# Patient Record
Sex: MALE | Race: WHITE | NOT HISPANIC OR LATINO | Employment: UNEMPLOYED | URBAN - METROPOLITAN AREA
[De-identification: names, ages, dates, MRNs, and addresses within clinical notes are randomized per-mention and may not be internally consistent; named-entity substitution may affect disease eponyms.]

---

## 2021-07-12 ENCOUNTER — OFFICE VISIT (OUTPATIENT)
Dept: FAMILY MEDICINE CLINIC | Facility: CLINIC | Age: 26
End: 2021-07-12

## 2021-07-12 VITALS
OXYGEN SATURATION: 98 % | HEART RATE: 66 BPM | WEIGHT: 167 LBS | HEIGHT: 73 IN | BODY MASS INDEX: 22.13 KG/M2 | SYSTOLIC BLOOD PRESSURE: 122 MMHG | DIASTOLIC BLOOD PRESSURE: 80 MMHG

## 2021-07-12 DIAGNOSIS — Z00.00 PREVENTATIVE HEALTH CARE: ICD-10-CM

## 2021-07-12 DIAGNOSIS — J40 BRONCHITIS: ICD-10-CM

## 2021-07-12 DIAGNOSIS — F39 MOOD DISORDER (HCC): Primary | ICD-10-CM

## 2021-07-12 PROCEDURE — 80048 BASIC METABOLIC PNL TOTAL CA: CPT | Performed by: NURSE PRACTITIONER

## 2021-07-12 PROCEDURE — 86803 HEPATITIS C AB TEST: CPT | Performed by: NURSE PRACTITIONER

## 2021-07-12 PROCEDURE — 85027 COMPLETE CBC AUTOMATED: CPT | Performed by: NURSE PRACTITIONER

## 2021-07-12 PROCEDURE — 99203 OFFICE O/P NEW LOW 30 MIN: CPT | Performed by: NURSE PRACTITIONER

## 2021-07-12 RX ORDER — METHYLPREDNISOLONE 4 MG/1
TABLET ORAL
Qty: 21 TABLET | Refills: 0 | Status: SHIPPED | OUTPATIENT
Start: 2021-07-12 | End: 2021-07-19

## 2021-07-12 RX ORDER — AZITHROMYCIN 250 MG/1
TABLET, FILM COATED ORAL
Qty: 6 TABLET | Refills: 0 | Status: SHIPPED | OUTPATIENT
Start: 2021-07-12 | End: 2021-07-19

## 2021-07-12 RX ORDER — HYDROXYZINE PAMOATE 25 MG/1
25 CAPSULE ORAL 3 TIMES DAILY PRN
Qty: 30 CAPSULE | Refills: 1 | Status: SHIPPED | OUTPATIENT
Start: 2021-07-12

## 2021-07-12 RX ORDER — ARIPIPRAZOLE 2 MG/1
2 TABLET ORAL DAILY
Qty: 30 TABLET | Refills: 1 | Status: SHIPPED | OUTPATIENT
Start: 2021-07-12 | End: 2021-09-30 | Stop reason: SDUPTHER

## 2021-07-12 RX ORDER — ALBUTEROL SULFATE 90 UG/1
2 AEROSOL, METERED RESPIRATORY (INHALATION) EVERY 4 HOURS PRN
Qty: 1 G | Refills: 0 | Status: SHIPPED | OUTPATIENT
Start: 2021-07-12 | End: 2021-07-19 | Stop reason: SDUPTHER

## 2021-07-12 NOTE — ASSESSMENT & PLAN NOTE
1.  Start Z-Vignesh  2.  Start Medrol Dosepak  3.  ProAir 2 puffs every 4 hours as needed for dyspnea or wheezing  4.  If no improvement within 3 to 5 days

## 2021-07-12 NOTE — ASSESSMENT & PLAN NOTE
1.  Symptom presentation consistent with bipolar disorder  2.  Start Abilify 2 mg daily  3.  Refer to psychiatry  4.  Patient to contact [facility] senior care to determine if internal medical team will provide Abilify while incarcerated

## 2021-07-12 NOTE — PROGRESS NOTES
"Chief Complaint  Establish Care (dealing with a lot of anger issues/depression and anxiety)    Subjective          Ethan Nicole presents to [facility]  History of Present Illness    Patient here to establish care.     Patient presents with worsening anxiety, depression and mood instability.  Patient reports he struggles with anger and inability to control his anger.  He has a history of illicit drug use.  Patient reports he previously took Abilify which seemed to work well for him.  He is anxious about starting new medication.  Patient reports that he has a court date in 2 weeks which may result in incarceration.  He is concerned about inconsistency in receiving medication.    Patient also c/o productive cough, wheezing, low grade fever, and dyspnea. Symptoms started approximately four days ago.  Patient's girlfriend reports that her children were recently ill with a respiratory virus.      Objective   Vital Signs:   /80 (BP Location: Left arm, Patient Position: Sitting, Cuff Size: Adult)   Pulse 66   Ht 185.4 cm (73\")   Wt 75.8 kg (167 lb)   SpO2 98%   BMI 22.03 kg/m²       Physical Exam  Constitutional:       Appearance: Normal appearance.   HENT:      Head: Normocephalic.   Cardiovascular:      Rate and Rhythm: Normal rate and regular rhythm.   Pulmonary:      Effort: Pulmonary effort is normal.      Breath sounds: Examination of the right-lower field reveals decreased breath sounds and wheezing. Examination of the left-lower field reveals decreased breath sounds and wheezing. Decreased breath sounds and wheezing present.   Abdominal:      General: Abdomen is flat. Bowel sounds are normal.      Palpations: Abdomen is soft.   Musculoskeletal:         General: Normal range of motion.      Cervical back: Neck supple.      Right lower leg: No edema.      Left lower leg: No edema.   Skin:     General: Skin is warm and dry.   Neurological:      Mental Status: He is alert and " oriented to person, place, and time.      Gait: Gait is intact.   Psychiatric:         Attention and Perception: Attention normal.         Mood and Affect: Mood is anxious.         Speech: Speech normal.        Result Review :                 Assessment and Plan    Diagnoses and all orders for this visit:    1. Mood disorder (CMS/HCC) (Primary)  Assessment & Plan:  1.  Symptom presentation consistent with bipolar disorder  2.  Start Abilify 2 mg daily  3.  Refer to psychiatry  4.  Patient to contact [facility] snf to determine if internal medical team will provide Abilify while incarcerated    Orders:  -     Ambulatory Referral to Psychiatry    2. Preventative health care  Assessment & Plan:  1.  Check labs    Orders:  -     CBC (No Diff)  -     Basic Metabolic Panel  -     Hepatitis C Antibody    3. Bronchitis  Assessment & Plan:  1.  Start Z-Vignesh  2.  Start Medrol Dosepak  3.  ProAir 2 puffs every 4 hours as needed for dyspnea or wheezing  4.  If no improvement within 3 to 5 days      Other orders  -     ARIPiprazole (Abilify) 2 MG tablet; Take 1 tablet by mouth Daily.  Dispense: 30 tablet; Refill: 1  -     hydrOXYzine pamoate (Vistaril) 25 MG capsule; Take 1 capsule by mouth 3 (Three) Times a Day As Needed for Itching.  Dispense: 30 capsule; Refill: 1  -     albuterol sulfate  (90 Base) MCG/ACT inhaler; Inhale 2 puffs Every 4 (Four) Hours As Needed for Wheezing.  Dispense: 1 g; Refill: 0  -     methylPREDNISolone (MEDROL) 4 MG dose pack; Take as directed on package instructions.  Dispense: 21 tablet; Refill: 0  -     azithromycin (Zithromax) 250 MG tablet; Take 2 tablets the first day, then 1 tablet daily for 4 days.  Dispense: 6 tablet; Refill: 0    I spent 30 minutes caring for Ethan on this date of service. This time includes time spent by me in the following activities:preparing for the visit, obtaining and/or reviewing a separately obtained history, performing a medically appropriate examination and/or  evaluation , counseling and educating the patient/family/caregiver, ordering medications, tests, or procedures, referring and communicating with other health care professionals , documenting information in the medical record and care coordination  Follow Up   Return in about 4 weeks (around 8/9/2021).  Patient was given instructions and counseling regarding his condition or for health maintenance advice. Please see specific information pulled into the AVS if appropriate.

## 2021-07-13 LAB
ANION GAP SERPL CALCULATED.3IONS-SCNC: 17.7 MMOL/L (ref 5–15)
BUN SERPL-MCNC: 10 MG/DL (ref 6–20)
BUN/CREAT SERPL: 10.4 (ref 7–25)
CALCIUM SPEC-SCNC: 9.5 MG/DL (ref 8.6–10.5)
CHLORIDE SERPL-SCNC: 101 MMOL/L (ref 98–107)
CO2 SERPL-SCNC: 25.3 MMOL/L (ref 22–29)
CREAT SERPL-MCNC: 0.96 MG/DL (ref 0.76–1.27)
DEPRECATED RDW RBC AUTO: 44.3 FL (ref 37–54)
ERYTHROCYTE [DISTWIDTH] IN BLOOD BY AUTOMATED COUNT: 13.2 % (ref 12.3–15.4)
GFR SERPL CREATININE-BSD FRML MDRD: 97 ML/MIN/1.73
GLUCOSE SERPL-MCNC: 84 MG/DL (ref 65–99)
HCT VFR BLD AUTO: 46.5 % (ref 37.5–51)
HCV AB SER DONR QL: NORMAL
HGB BLD-MCNC: 15.5 G/DL (ref 13–17.7)
MCH RBC QN AUTO: 30.1 PG (ref 26.6–33)
MCHC RBC AUTO-ENTMCNC: 33.3 G/DL (ref 31.5–35.7)
MCV RBC AUTO: 90.3 FL (ref 79–97)
PLATELET # BLD AUTO: 217 10*3/MM3 (ref 140–450)
PMV BLD AUTO: 11.8 FL (ref 6–12)
POTASSIUM SERPL-SCNC: 4.7 MMOL/L (ref 3.5–5.2)
RBC # BLD AUTO: 5.15 10*6/MM3 (ref 4.14–5.8)
SODIUM SERPL-SCNC: 144 MMOL/L (ref 136–145)
WBC # BLD AUTO: 4.57 10*3/MM3 (ref 3.4–10.8)

## 2021-07-13 NOTE — TELEPHONE ENCOUNTER
Caller: BRODY DE SOUZA    Relationship: Emergency Contact    Best call back number: [phone]    Medication needed:   Requested Prescriptions     Pending Prescriptions Disp Refills   • ARIPiprazole (Abilify) 2 MG tablet 30 tablet 1     Sig: Take 1 tablet by mouth Daily.       When do you need the refill by: ASAP    What additional details did the patient provide when requesting the medication: PATIENT HAS MISPLACED BOTTLE AND NEEDS NEW     Does the patient have less than a 3 day supply:  [x] Yes  [] No    What is the patient's preferred pharmacy: [facility]/PHARMACY #[id number] [place], [place] - [address] [place] [address] [phone] [facility] [phone]

## 2021-07-14 ENCOUNTER — TELEPHONE (OUTPATIENT)
Dept: FAMILY MEDICINE CLINIC | Facility: CLINIC | Age: 26
End: 2021-07-14

## 2021-07-14 RX ORDER — ARIPIPRAZOLE 2 MG/1
2 TABLET ORAL DAILY
Qty: 30 TABLET | Refills: 1 | OUTPATIENT
Start: 2021-07-14

## 2021-07-14 NOTE — TELEPHONE ENCOUNTER
"Hub ok to share:    \"There is a refill on his bottle of abilify. He just needs to contact pharmacy and tell them to fill. Advise him that insurance will not likely cover since it is too soon. \"  "

## 2021-07-19 ENCOUNTER — OFFICE VISIT (OUTPATIENT)
Dept: FAMILY MEDICINE CLINIC | Facility: CLINIC | Age: 26
End: 2021-07-19

## 2021-07-19 VITALS
HEART RATE: 69 BPM | BODY MASS INDEX: 21.74 KG/M2 | OXYGEN SATURATION: 99 % | WEIGHT: 164 LBS | SYSTOLIC BLOOD PRESSURE: 138 MMHG | HEIGHT: 73 IN | DIASTOLIC BLOOD PRESSURE: 64 MMHG

## 2021-07-19 DIAGNOSIS — M25.562 CHRONIC PAIN OF LEFT KNEE: Primary | ICD-10-CM

## 2021-07-19 DIAGNOSIS — G89.29 CHRONIC PAIN OF LEFT KNEE: Primary | ICD-10-CM

## 2021-07-19 DIAGNOSIS — J40 BRONCHITIS: ICD-10-CM

## 2021-07-19 PROCEDURE — 99213 OFFICE O/P EST LOW 20 MIN: CPT | Performed by: NURSE PRACTITIONER

## 2021-07-19 PROCEDURE — 96372 THER/PROPH/DIAG INJ SC/IM: CPT | Performed by: NURSE PRACTITIONER

## 2021-07-19 RX ORDER — METHYLPREDNISOLONE ACETATE 40 MG/ML
40 INJECTION, SUSPENSION INTRA-ARTICULAR; INTRALESIONAL; INTRAMUSCULAR; SOFT TISSUE ONCE
Status: COMPLETED | OUTPATIENT
Start: 2021-07-19 | End: 2021-07-19

## 2021-07-19 RX ORDER — LEVOFLOXACIN 500 MG/1
500 TABLET, FILM COATED ORAL DAILY
Qty: 7 TABLET | Refills: 0 | Status: SHIPPED | OUTPATIENT
Start: 2021-07-19

## 2021-07-19 RX ORDER — ALBUTEROL SULFATE 90 UG/1
2 AEROSOL, METERED RESPIRATORY (INHALATION) EVERY 4 HOURS PRN
Qty: 1 G | Refills: 0 | Status: SHIPPED | OUTPATIENT
Start: 2021-07-19

## 2021-07-19 RX ADMIN — METHYLPREDNISOLONE ACETATE 40 MG: 40 INJECTION, SUSPENSION INTRA-ARTICULAR; INTRALESIONAL; INTRAMUSCULAR; SOFT TISSUE at 14:38

## 2021-07-19 NOTE — PROGRESS NOTES
"Chief Complaint  Cough (bronchitis not getting any better. He finished abx and steroid) and Knee Pain (left knee pain/buckling all the time. Feels like something is torn)    Subjective          Ethan Nicole presents to [facility]  History of Present Illness    Patient continues to have productive cough, wheezing and dyspnea.  He completed azithromycin and Medrol Dosepak with some relief but reports symptoms persist.  Patient denies fever.    Patient also c/o chronic left knee pain, reports his knee neva when he bears full weight in certain positions.  Patient reports it has been injured for approximately 3 years but cannot recall specifics of injury.  He believes he had an x-ray 3 years ago when incarcerated, unsure of results.  Patient reports that also has intermittent swelling.  Pain is moderate to severe.    Objective   Vital Signs:   /64 (BP Location: Left arm, Patient Position: Sitting, Cuff Size: Adult)   Pulse 69   Ht 185.4 cm (73\")   Wt 74.4 kg (164 lb)   SpO2 99%   BMI 21.64 kg/m²       Physical Exam  Constitutional:       Appearance: Normal appearance.   HENT:      Head: Normocephalic.   Cardiovascular:      Rate and Rhythm: Normal rate and regular rhythm.   Pulmonary:      Effort: Pulmonary effort is normal.      Breath sounds: Examination of the right-middle field reveals wheezing. Examination of the left-middle field reveals wheezing. Wheezing present.   Abdominal:      General: Abdomen is flat. Bowel sounds are normal.      Palpations: Abdomen is soft.   Musculoskeletal:         General: Normal range of motion.      Cervical back: Neck supple.      Left knee: Swelling present. Tenderness present.      Right lower leg: No edema.      Left lower leg: No edema.   Skin:     General: Skin is warm and dry.   Neurological:      Mental Status: He is alert and oriented to person, place, and time.      Gait: Gait is intact.   Psychiatric:         Attention and " Perception: Attention normal.         Mood and Affect: Mood normal.         Speech: Speech normal.        Result Review :                 Assessment and Plan    Diagnoses and all orders for this visit:    1. Chronic pain of left knee (Primary)  Assessment & Plan:  1.  X-ray left knee  2.  MRI likely indicated, will await x-ray results    Orders:  -     XR Knee 3 View Left; Future    2. Bronchitis  Assessment & Plan:  1.  Lung sounds are abnormal but improved  2.  Depo-Medrol 40 mg IM x1  3.  Levaquin 500 milligrams daily x7 days  4.  Patient to use ProAir, 2 puffs as needed for dyspnea or wheezing  5.  Call if symptoms persist    Orders:  -     methylPREDNISolone acetate (DEPO-medrol) injection 40 mg    Other orders  -     albuterol sulfate  (90 Base) MCG/ACT inhaler; Inhale 2 puffs Every 4 (Four) Hours As Needed for Wheezing.  Dispense: 1 g; Refill: 0  -     levoFLOXacin (Levaquin) 500 MG tablet; Take 1 tablet by mouth Daily.  Dispense: 7 tablet; Refill: 0    I spent 20 minutes caring for Ethan on this date of service. This time includes time spent by me in the following activities:preparing for the visit, obtaining and/or reviewing a separately obtained history, performing a medically appropriate examination and/or evaluation , counseling and educating the patient/family/caregiver, ordering medications, tests, or procedures and documenting information in the medical record  Follow Up   Return if symptoms worsen or fail to improve.  Patient was given instructions and counseling regarding his condition or for health maintenance advice. Please see specific information pulled into the AVS if appropriate.

## 2021-07-19 NOTE — ASSESSMENT & PLAN NOTE
1.  Lung sounds are abnormal but improved  2.  Depo-Medrol 40 mg IM x1  3.  Levaquin 500 milligrams daily x7 days  4.  Patient to use ProAir, 2 puffs as needed for dyspnea or wheezing  5.  Call if symptoms persist

## 2021-09-30 RX ORDER — ARIPIPRAZOLE 2 MG/1
2 TABLET ORAL DAILY
Qty: 30 TABLET | Refills: 1 | Status: SHIPPED | OUTPATIENT
Start: 2021-09-30

## 2021-09-30 NOTE — TELEPHONE ENCOUNTER
Caller: BRODY DE SOUZA    Relationship: Emergency Contact      Medication requested (name and dosage): ARIPiprazole (Abilify) 2 MG tablet    Pharmacy where request should be sent: [facility]/pharmacy #[id number] - [place], [place] - [address] - [phone]  - [phone]   [phone]    Additional details provided by patient: PATIENT HAS BEEN INCARCERATED FOR THE PAST MONTH AND A HALF AND WAS BEING GIVEN HIS MEDICATION WHILE IN skilled nursing BY THE PHYSICIAN AT THE skilled nursing, PATIENT HAS BEEN RELEASED AND HAS NO MEDICATION.    Best call back number: [phone]*    Does the patient have less than a 3 day supply:  [x] Yes  [] No    Aubree Hallman   09/30/21 12:16 EDT

## 2021-10-13 ENCOUNTER — TELEPHONE (OUTPATIENT)
Dept: FAMILY MEDICINE CLINIC | Facility: CLINIC | Age: 26
End: 2021-10-13
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
## 2021-11-12 ENCOUNTER — TELEPHONE (OUTPATIENT)
Dept: FAMILY MEDICINE CLINIC | Facility: CLINIC | Age: 26
End: 2021-11-12

## 2021-11-12 NOTE — TELEPHONE ENCOUNTER
Pt's mother called to request the phone number for psychiatrist given to her by you at last visit.  Do you remember who it was for?  Thank you.

## 2022-05-11 ENCOUNTER — TELEPHONE (OUTPATIENT)
Dept: FAMILY MEDICINE CLINIC | Facility: CLINIC | Age: 27
End: 2022-05-11

## 2024-08-14 ENCOUNTER — HOSPITAL ENCOUNTER (EMERGENCY)
Facility: HOSPITAL | Age: 29
Discharge: HOME | End: 2024-08-14
Attending: EMERGENCY MEDICINE
Payer: MEDICAID

## 2024-08-14 VITALS
DIASTOLIC BLOOD PRESSURE: 68 MMHG | HEIGHT: 73 IN | BODY MASS INDEX: 21.07 KG/M2 | RESPIRATION RATE: 16 BRPM | SYSTOLIC BLOOD PRESSURE: 116 MMHG | TEMPERATURE: 98 F | OXYGEN SATURATION: 100 % | HEART RATE: 100 BPM | WEIGHT: 159 LBS

## 2024-08-14 DIAGNOSIS — L03.116 CELLULITIS OF LEFT THIGH: Primary | ICD-10-CM

## 2024-08-14 PROCEDURE — 99283 EMERGENCY DEPT VISIT LOW MDM: CPT | Performed by: EMERGENCY MEDICINE

## 2024-08-14 PROCEDURE — 96365 THER/PROPH/DIAG IV INF INIT: CPT

## 2024-08-14 PROCEDURE — 25010000002 CEFTRIAXONE PER 250 MG: Performed by: EMERGENCY MEDICINE

## 2024-08-14 PROCEDURE — 63710000001 PREDNISONE PER 1 MG: Performed by: EMERGENCY MEDICINE

## 2024-08-14 PROCEDURE — 99283 EMERGENCY DEPT VISIT LOW MDM: CPT

## 2024-08-14 RX ORDER — CEPHALEXIN 500 MG/1
500 CAPSULE ORAL 4 TIMES DAILY
Qty: 40 CAPSULE | Refills: 0 | Status: SHIPPED | OUTPATIENT
Start: 2024-08-14 | End: 2024-08-24

## 2024-08-14 RX ORDER — SODIUM CHLORIDE 0.9 % (FLUSH) 0.9 %
10 SYRINGE (ML) INJECTION AS NEEDED
Status: DISCONTINUED | OUTPATIENT
Start: 2024-08-14 | End: 2024-08-15 | Stop reason: HOSPADM

## 2024-08-14 RX ORDER — PREDNISONE 20 MG/1
40 TABLET ORAL ONCE
Status: COMPLETED | OUTPATIENT
Start: 2024-08-14 | End: 2024-08-14

## 2024-08-14 RX ORDER — PREDNISONE 20 MG/1
20 TABLET ORAL 2 TIMES DAILY
Qty: 10 TABLET | Refills: 0 | Status: SHIPPED | OUTPATIENT
Start: 2024-08-14 | End: 2024-08-19

## 2024-08-14 RX ADMIN — PREDNISONE 40 MG: 20 TABLET ORAL at 22:51

## 2024-08-14 RX ADMIN — CEFTRIAXONE 1000 MG: 1 INJECTION, POWDER, FOR SOLUTION INTRAMUSCULAR; INTRAVENOUS at 22:58

## 2024-08-15 NOTE — ED NOTES
"Pt gave himself a tattoo on his left upper leg 1 weeks ago. 3 days ago he developed redness and swelling with pain. He states it has been \"oozing\" and feels \"hot\"   "

## 2024-08-15 NOTE — FSED PROVIDER NOTE
Subjective   History of Present Illness  Patient 28-year-old man who performed a self tattoo to his left thigh approximate 4 days ago.  He began noticing redness and swelling and tenderness over the past 2 or 3 days.  He had no fevers nausea or vomiting.  No numbness or weakness.  There is mild to moderate tenderness to the area of the tattoo.    Review of Systems    Past Medical History:   Diagnosis Date    Anger     Anxiety     Depression     Drug abuse        Allergies   Allergen Reactions    Amoxicillin Swelling       Past Surgical History:   Procedure Laterality Date    BILATERAL INSERTION OF EAR TUBES AND ADENOIDECTOMY         Family History   Family history unknown: Yes       Social History     Socioeconomic History    Marital status: Single   Tobacco Use    Smoking status: Former     Current packs/day: 0.00     Average packs/day: 2.0 packs/day for 5.0 years (10.0 ttl pk-yrs)     Types: Cigarettes     Start date: 6/28/2016     Quit date: 6/28/2021     Years since quitting: 3.1    Smokeless tobacco: Never   Vaping Use    Vaping status: Every Day    Substances: Nicotine   Substance and Sexual Activity    Alcohol use: Yes     Comment: occ    Drug use: Yes     Types: Marijuana           Objective   Physical Exam  Vitals and nursing note reviewed.   Constitutional:       General: He is not in acute distress.     Appearance: Normal appearance. He is not ill-appearing or toxic-appearing.   HENT:      Head: Normocephalic and atraumatic.      Mouth/Throat:      Mouth: Mucous membranes are moist.   Eyes:      Extraocular Movements: Extraocular movements intact.   Cardiovascular:      Rate and Rhythm: Normal rate.      Pulses: Normal pulses.   Pulmonary:      Effort: Pulmonary effort is normal.   Musculoskeletal:         General: Tenderness present. Normal range of motion.      Cervical back: Normal range of motion and neck supple.      Comments: Left anterior thigh examination per skin exam with evidence of cellulitis.    Skin:     General: Skin is warm and dry.      Capillary Refill: Capillary refill takes less than 2 seconds.      Findings: Erythema present.      Comments: Left anterior thigh with erythema and warmth.  There is a new tattoo noted.  No petechiae.  Patient is neurovascularly intact in the left foot with 2+ pedal pulse present.  No hip or knee tenderness.   Neurological:      General: No focal deficit present.      Mental Status: He is alert.         Procedures           ED Course                                           Medical Decision Making  Problems Addressed:  Cellulitis of left thigh: complicated acute illness or injury    Risk  Prescription drug management.    Patient with tattoo which he performed on himself 4 days ago.  Shortly thereafter he began having redness and swelling and tenderness over the area of the tattoo in the left anterior thigh.  The patient has had no fevers or nausea or vomiting.  He does not appear toxic.  He does have erythema and warmth and tenderness.  Patient will be given IV Rocephin and placed on Keflex and prednisone.  Patient will return or go directly to hospital if having worsening symptoms or fevers or any concerns.    Final diagnoses:   Cellulitis of left thigh       ED Disposition  ED Disposition       ED Disposition   Discharge    Condition   Stable    Comment   --               Christelle Rosa, APRKEHINDE  [address]  [address]  [place] [id number] [phone]    In 1 week      [place] [address] [place] [id number] [phone]    If symptoms worsen         Medication List        New Prescriptions      cephalexin 500 MG capsule  Commonly known as: KEFLEX  Take 1 capsule by mouth 4 (Four) Times a Day for 10 days.     predniSONE 20 MG tablet  Commonly known as: DELTASONE  Take 1 tablet by mouth 2 (Two) Times a Day for 5 days.               Where to Get Your Medications        These medications were sent to  [id number] - [place], [place] - [address] - [phone]  - [phone]   [address] [facility] [place] [address]      Hours: 24-hours Phone: [phone]   cephalexin 500 MG capsule  predniSONE 20 MG tablet

## 2024-08-15 NOTE — DISCHARGE INSTRUCTIONS
Please take prednisone and Keflex as prescribed.  Please follow-up with your provider.  Seek immediate medical attention if having worsening symptoms or fevers or any concerns.

## 2024-09-05 ENCOUNTER — TELEPHONE (OUTPATIENT)
Dept: FAMILY MEDICINE CLINIC | Facility: CLINIC | Age: 29
End: 2024-09-05
Payer: MEDICAID

## 2024-09-05 NOTE — TELEPHONE ENCOUNTER
Caller: BRODY DE SOUZA    Relationship: Emergency Contact    Best call back number: [phone] (     Requested Prescriptions:   Venlafaxine (EFFEXOR)  Lamotrigine   Clonazepam (KLONOPIN)    Pharmacy where request should be sent:    [id number] - [place], [place] - [address] [address] [facility] - [phone]  - [phone] FX   Last office visit with prescribing clinician: Visit date not found   Last telemedicine visit with prescribing clinician: Visit date not found   Next office visit with prescribing clinician: 9/24/24    Additional details provided by patient:   THESE WERE ADMINISTER BY [employer], DR. HOOVER  HAS APPOINTMENT    Does the patient have less than a 3 day supply:  [x] Yes  [] No    Would you like a call back once the refill request has been completed: [] Yes [] No    If the office needs to give you a call back, can they leave a voicemail: [] Yes [] No    Brendan Chance   09/05/24 09:29 EDT

## 2024-09-05 NOTE — TELEPHONE ENCOUNTER
Spoke with patient and let him know we have not seen him in over a year and we are unable to refill these medications without an appointment. Advised patient to contact Dr Medina the prescribing provider to see if he can get enough medication until he comes in to see Christelle on 9/24. He expressed understanding and plans to keep his appt for a follow up here as well.

## 2024-09-05 NOTE — TELEPHONE ENCOUNTER
Correct, will need to be seen prior to any prescriptions written.  Follow-up with psychiatry outpatient will also be recommended, especially for long-term prescription of Klonopin

## 2024-09-24 ENCOUNTER — OFFICE VISIT (OUTPATIENT)
Dept: FAMILY MEDICINE CLINIC | Facility: CLINIC | Age: 29
End: 2024-09-24
Payer: MEDICAID

## 2024-09-24 VITALS
OXYGEN SATURATION: 97 % | HEIGHT: 73 IN | BODY MASS INDEX: 22.93 KG/M2 | WEIGHT: 173 LBS | HEART RATE: 105 BPM | SYSTOLIC BLOOD PRESSURE: 124 MMHG | DIASTOLIC BLOOD PRESSURE: 70 MMHG

## 2024-09-24 DIAGNOSIS — F41.9 ANXIETY: ICD-10-CM

## 2024-09-24 DIAGNOSIS — F31.62 BIPOLAR DISORDER, CURRENT EPISODE MIXED, MODERATE: Primary | ICD-10-CM

## 2024-09-24 DIAGNOSIS — M79.641 RIGHT HAND PAIN: ICD-10-CM

## 2024-09-24 PROCEDURE — 1125F AMNT PAIN NOTED PAIN PRSNT: CPT | Performed by: NURSE PRACTITIONER

## 2024-09-24 PROCEDURE — 99214 OFFICE O/P EST MOD 30 MIN: CPT | Performed by: NURSE PRACTITIONER

## 2024-09-24 PROCEDURE — 1159F MED LIST DOCD IN RCRD: CPT | Performed by: NURSE PRACTITIONER

## 2024-09-24 PROCEDURE — 1160F RVW MEDS BY RX/DR IN RCRD: CPT | Performed by: NURSE PRACTITIONER

## 2024-09-24 RX ORDER — ALBUTEROL SULFATE 90 UG/1
2 INHALANT RESPIRATORY (INHALATION) EVERY 4 HOURS PRN
Qty: 1 G | Refills: 0 | Status: SHIPPED | OUTPATIENT
Start: 2024-09-24

## 2024-09-24 RX ORDER — LAMOTRIGINE 25 MG/1
50 TABLET ORAL DAILY
Qty: 60 TABLET | Refills: 1 | Status: SHIPPED | OUTPATIENT
Start: 2024-09-24

## 2024-11-04 ENCOUNTER — TELEPHONE (OUTPATIENT)
Dept: FAMILY MEDICINE CLINIC | Facility: CLINIC | Age: 29
End: 2024-11-04
Payer: MEDICAID

## 2024-11-04 NOTE — TELEPHONE ENCOUNTER
Spoke with Ethan Nicole regarding Xray hand order. States he is feeling better and no longer needing    Relay

## 2024-12-16 DIAGNOSIS — F31.62 BIPOLAR DISORDER, CURRENT EPISODE MIXED, MODERATE: ICD-10-CM

## 2024-12-16 RX ORDER — LAMOTRIGINE 25 MG/1
50 TABLET ORAL DAILY
Qty: 60 TABLET | Refills: 1 | Status: SHIPPED | OUTPATIENT
Start: 2024-12-16

## 2024-12-16 RX ORDER — ALBUTEROL SULFATE 90 UG/1
2 INHALANT RESPIRATORY (INHALATION) EVERY 4 HOURS PRN
Qty: 1 G | Refills: 0 | Status: SHIPPED | OUTPATIENT
Start: 2024-12-16

## 2024-12-16 RX ORDER — ALBUTEROL SULFATE 90 UG/1
2 INHALANT RESPIRATORY (INHALATION) EVERY 4 HOURS PRN
OUTPATIENT
Start: 2024-12-16

## 2024-12-16 NOTE — TELEPHONE ENCOUNTER
Caller: MALENA DE SOUZAA    Relationship: Emergency Contact    Best call back number:     [phone]       Requested Prescriptions:   Requested Prescriptions     Pending Prescriptions Disp Refills    lamoTRIgine (LaMICtal) 25 MG tablet 60 tablet 1     Sig: Take 2 tablets by mouth Daily.    albuterol sulfate  (90 Base) MCG/ACT inhaler 1 g 0     Sig: Inhale 2 puffs Every 4 (Four) Hours As Needed for Wheezing.        Pharmacy where request should be sent: [facility]/PHARMACY #[id number] [address] [phone] [facility] [phone]      Last office visit with prescribing clinician: 9/24/2024   Last telemedicine visit with prescribing clinician: Visit date not found   Next office visit with prescribing clinician: Visit date not found     Additional details provided by patient:  OUT    Does the patient have less than a 3 day supply:  [x] Yes  [] No    Would you like a call back once the refill request has been completed: [] Yes [] No    If the office needs to give you a call back, can they leave a voicemail: [] Yes [] No    Brendan Chance   12/16/24 14:41 EST

## 2025-01-17 DIAGNOSIS — F31.62 BIPOLAR DISORDER, CURRENT EPISODE MIXED, MODERATE: ICD-10-CM

## 2025-01-17 RX ORDER — LAMOTRIGINE 25 MG/1
50 TABLET ORAL DAILY
Qty: 60 TABLET | Refills: 1 | Status: SHIPPED | OUTPATIENT
Start: 2025-01-17

## 2025-01-17 NOTE — TELEPHONE ENCOUNTER
Caller: BRODY DE SOUZA    Relationship: Emergency Contact    Best call back number: [phone]     Requested Prescriptions:   Requested Prescriptions     Pending Prescriptions Disp Refills    lamoTRIgine (LaMICtal) 25 MG tablet 60 tablet 1     Sig: Take 2 tablets by mouth Daily.        Pharmacy where request should be sent: [facility]/PHARMACY #[id number] - [facility] [place] [address] [phone] [facility] [phone] FX     Last office visit with prescribing clinician: 9/24/2024   Last telemedicine visit with prescribing clinician: Visit date not found   Next office visit with prescribing clinician: 2/4/2025     Additional details provided by patient: PATIENT DID NOT REALIZE HE WAS ONLY SUPPOSED TO TAKE 2 A AY SO HE IS NOW RUNNING OUT EARLY    Does the patient have less than a 3 day supply:  [x] Yes  [] No    Would you like a call back once the refill request has been completed: [] Yes [x] No    If the office needs to give you a call back, can they leave a voicemail: [] Yes [x] No    Sean Valdez Rep   01/17/25 11:28 EST

## 2025-02-03 ENCOUNTER — TELEPHONE (OUTPATIENT)
Dept: FAMILY MEDICINE CLINIC | Facility: CLINIC | Age: 30
End: 2025-02-03
Payer: MEDICAID

## 2025-02-03 NOTE — TELEPHONE ENCOUNTER
Spoke with pt to confirm 2/4 appt, pt will call later in the week to reschedule due to fiance going into labor

## 2025-02-11 ENCOUNTER — TELEPHONE (OUTPATIENT)
Dept: FAMILY MEDICINE CLINIC | Facility: CLINIC | Age: 30
End: 2025-02-11
Payer: MEDICAID

## 2025-02-11 NOTE — TELEPHONE ENCOUNTER
Patient is prescribed 50 mg of Lamictal daily.  So they are wanting a prescription for 100 mg tablet, take 1/2 tablet daily?

## 2025-02-11 NOTE — TELEPHONE ENCOUNTER
Caller: DE SOUZABRODY     Relationship: Emergency Contact     Best call back number: [phone] (      Requested Prescriptions:      Lamotrigine 2 TABLETS DAILY 25 MG      Pharmacy where request should be sent:    [facility]/pharmacy #[id number] - [place], [place] [address] - [phone]  - [phone] FX   Last office visit with prescribing clinician: Visit date not found   Last telemedicine visit with prescribing clinician: Visit date not found   Next office visit with prescribing clinician: 9/24/24     Additional details provided by patient:   PATIENT IS IN [facility] prison.  THE prison NEEDS A WRITTEN PRESCRIPTION AUTHORIZING THE PATIENT TAKE THIS MEDICATION.  THE PATIENT NEEDS  MG TABLETS.  CAN THE PRESCRIPTION BE CHANGED?  PLEASE ADVISE.

## 2025-02-11 NOTE — TELEPHONE ENCOUNTER
Tried calling emergency contract back, but there was no answer and unable to leave a vm because mailbox is full.

## 2025-02-12 DIAGNOSIS — F31.62 BIPOLAR DISORDER, CURRENT EPISODE MIXED, MODERATE: ICD-10-CM

## 2025-02-12 RX ORDER — LAMOTRIGINE 25 MG/1
50 TABLET ORAL 2 TIMES DAILY
Qty: 120 TABLET | Refills: 0 | Status: SHIPPED | OUTPATIENT
Start: 2025-02-12 | End: 2025-02-12 | Stop reason: SDUPTHER

## 2025-02-12 RX ORDER — LAMOTRIGINE 25 MG/1
50 TABLET ORAL 2 TIMES DAILY
Qty: 120 TABLET | Refills: 0 | Status: SHIPPED | OUTPATIENT
Start: 2025-02-12 | End: 2025-03-14

## 2025-02-12 NOTE — TELEPHONE ENCOUNTER
Left message at [facility] to try and get dosing of medication asked that they call back and ask to speak to Vera directly.

## 2025-02-12 NOTE — TELEPHONE ENCOUNTER
Spoke with Nory and she said the patient was seen at [facility] originally and they had him taking 100mg daily (4 of the 25mg) and when he came in to see you instead of this they were under the impression that this was the dose he was supposed to continue. He has continued taking it as 100mg daily and he had now run out because it was written for 50mg and they did not realize this. She is asking about having it written for 100mg and they can take the prescription up to him at [facility] so he can continue on the medication. She will be there anywhere from 2 weeks to a month.

## 2025-02-12 NOTE — TELEPHONE ENCOUNTER
They asked if we could send this to Jennifer in [place]. They told family that they could  prescription and bring it to pt for them to administer.

## 2025-04-01 DIAGNOSIS — F31.62 BIPOLAR DISORDER, CURRENT EPISODE MIXED, MODERATE: ICD-10-CM

## 2025-04-01 RX ORDER — LAMOTRIGINE 25 MG/1
50 TABLET ORAL 2 TIMES DAILY
Qty: 120 TABLET | Refills: 0 | Status: SHIPPED | OUTPATIENT
Start: 2025-04-01 | End: 2025-05-01

## 2025-04-11 ENCOUNTER — HOSPITAL ENCOUNTER (OUTPATIENT)
Facility: HOSPITAL | Age: 30
Discharge: HOME | End: 2025-04-11
Attending: EMERGENCY MEDICINE
Payer: MEDICAID

## 2025-04-11 ENCOUNTER — APPOINTMENT (OUTPATIENT)
Dept: GENERAL RADIOLOGY | Facility: HOSPITAL | Age: 30
End: 2025-04-11
Payer: MEDICAID

## 2025-04-11 VITALS
SYSTOLIC BLOOD PRESSURE: 121 MMHG | WEIGHT: 183.6 LBS | HEART RATE: 57 BPM | RESPIRATION RATE: 18 BRPM | BODY MASS INDEX: 24.87 KG/M2 | OXYGEN SATURATION: 97 % | TEMPERATURE: 98.4 F | HEIGHT: 72 IN | DIASTOLIC BLOOD PRESSURE: 76 MMHG

## 2025-04-11 DIAGNOSIS — M25.531 BILATERAL WRIST PAIN: Primary | ICD-10-CM

## 2025-04-11 DIAGNOSIS — M25.532 BILATERAL WRIST PAIN: Primary | ICD-10-CM

## 2025-04-11 PROCEDURE — 25010000002 KETOROLAC TROMETHAMINE PER 15 MG

## 2025-04-11 PROCEDURE — 99213 OFFICE O/P EST LOW 20 MIN: CPT

## 2025-04-11 PROCEDURE — G0463 HOSPITAL OUTPT CLINIC VISIT: HCPCS

## 2025-04-11 PROCEDURE — 63710000001 PREDNISONE PER 1 MG

## 2025-04-11 PROCEDURE — 73110 X-RAY EXAM OF WRIST: CPT

## 2025-04-11 RX ORDER — PREDNISONE 20 MG/1
20 TABLET ORAL DAILY
Qty: 4 TABLET | Refills: 0 | Status: SHIPPED | OUTPATIENT
Start: 2025-04-11 | End: 2025-04-15

## 2025-04-11 RX ORDER — PREDNISONE 20 MG/1
60 TABLET ORAL ONCE
Status: COMPLETED | OUTPATIENT
Start: 2025-04-11 | End: 2025-04-11

## 2025-04-11 RX ORDER — KETOROLAC TROMETHAMINE 30 MG/ML
30 INJECTION, SOLUTION INTRAMUSCULAR; INTRAVENOUS ONCE
Status: COMPLETED | OUTPATIENT
Start: 2025-04-11 | End: 2025-04-11

## 2025-04-11 RX ADMIN — KETOROLAC TROMETHAMINE 30 MG: 30 INJECTION, SOLUTION INTRAMUSCULAR at 11:23

## 2025-04-11 RX ADMIN — PREDNISONE 60 MG: 20 TABLET ORAL at 11:22

## 2025-04-11 NOTE — DISCHARGE INSTRUCTIONS
Take the prednisone as prescribed.  You can use Tylenol as needed for discomfort.  Wear the wrist brace as needed.  Rest, ice, elevate.  You can apply ice for 20 minutes at a time.  Up with one of the hand specialist listed below for continued evaluation as previously discussed.  Return to the emergency room for any new or worsening symptoms.

## 2025-04-11 NOTE — Clinical Note
[facility] [place] [address] [place] [address] [place] [address]  Phone: [phone]    Ethan Nicole was seen and treated in our emergency department on 4/11/2025.  He may return to work on 04/14/2025.         Thank you for choosing [facility].    Srikanth Marei RN

## 2025-04-11 NOTE — FSED PROVIDER NOTE
[facility]    EMERGENCY DEPARTMENT ENCOUNTER    Room Number:  RY/RY  Date seen:  4/11/2025  Time seen: 11:58 EDT  PCP: Christelle Rosa APRN  Historian: Patient    HPI:  Chief complaint: Bilateral wrist pain  Context:Ethan Nicole is a 29 y.o. male who presents to the ED with c/o bilateral wrist pain.  Patient reports that he has been having right wrist pain for the last 4 days.  He reports now he is starting to have pain in his left wrist.  He reports he started a new job and has been working really hard.  He reports that his job requires a lot of repetitive movements.  He reports the pain is worse at night.    Timing: Constant  Duration: 4 days  Location: Bilateral wrist  Intensity/Severity: Moderate  Associated Symptoms: Wrist pain    MEDICAL RECORD REVIEW  Depression, anxiety    ALLERGIES  Amoxicillin    PAST MEDICAL HISTORY  Active Ambulatory Problems     Diagnosis Date Noted    Mood disorder 07/12/2021    Preventative health care 07/12/2021    Bronchitis 07/12/2021    Chronic pain of left knee 07/19/2021    Bipolar disorder, current episode mixed, moderate 09/24/2024    Anxiety 09/24/2024    Right hand pain 09/24/2024     Resolved Ambulatory Problems     Diagnosis Date Noted    No Resolved Ambulatory Problems     Past Medical History:   Diagnosis Date    Anger     Depression     Drug abuse        PAST SURGICAL HISTORY  Past Surgical History:   Procedure Laterality Date    BILATERAL INSERTION OF EAR TUBES AND ADENOIDECTOMY         FAMILY HISTORY  Family History   Family history unknown: Yes       SOCIAL HISTORY  Social History     Socioeconomic History    Marital status: Single   Tobacco Use    Smoking status: Former     Current packs/day: 0.00     Average packs/day: 2.0 packs/day for 5.0 years (10.0 ttl pk-yrs)     Types: Cigarettes     Start date: 6/28/2016     Quit date: 6/28/2021     Years since quitting: 3.7     Passive exposure: Past    Smokeless tobacco:  Never   Vaping Use    Vaping status: Every Day    Substances: Nicotine, Flavoring    Devices: Disposable   Substance and Sexual Activity    Alcohol use: Yes     Comment: occ    Drug use: Yes     Types: Marijuana       REVIEW OF SYSTEMS  Review of Systems    All systems reviewed and negative except for those discussed in HPI.     PHYSICAL EXAM    I have reviewed the triage vital signs and nursing notes.    ED Triage Vitals   Temp Heart Rate Resp BP SpO2   04/11/25 1055 04/11/25 1053 04/11/25 1053 04/11/25 1053 04/11/25 1053   98.4 °F (36.9 °C) 57 18 121/76 97 %      Temp src Heart Rate Source Patient Position BP Location FiO2 (%)   -- -- -- -- --              Physical Exam  Constitutional:       Appearance: Normal appearance. He is not toxic-appearing.   HENT:      Nose: Nose normal.      Mouth/Throat:      Mouth: Mucous membranes are moist.   Eyes:      Pupils: Pupils are equal, round, and reactive to light.   Cardiovascular:      Rate and Rhythm: Normal rate.      Pulses: Normal pulses.   Pulmonary:      Effort: Pulmonary effort is normal.   Musculoskeletal:      Right wrist: Tenderness present. No swelling, bony tenderness or snuff box tenderness. Decreased range of motion. Normal pulse.      Left wrist: No swelling, tenderness, bony tenderness or snuff box tenderness. Decreased range of motion. Normal pulse.      Right hand: No swelling, tenderness or bony tenderness. Normal range of motion. Normal sensation. Normal capillary refill. Normal pulse.      Left hand: No swelling, tenderness or bony tenderness. Normal range of motion. Normal sensation. Normal capillary refill. Normal pulse.      Comments: Mildly decreased range of motion due to discomfort   Skin:     Capillary Refill: Capillary refill takes less than 2 seconds.   Neurological:      General: No focal deficit present.      Mental Status: He is alert.   Psychiatric:         Mood and Affect: Mood normal.         Behavior: Behavior normal.         Vital  signs and nursing notes reviewed.        LAB RESULTS  No results found for this or any previous visit (from the past 24 hours).    Ordered the above labs and independently reviewed the results.      RADIOLOGY RESULTS  XR Wrist 3+ View Right  Result Date: 4/11/2025  XR WRIST 3+ VW RIGHT Date of Exam: 4/11/2025 11:10 AM EDT Indication: right wrist pain Comparison: None available. Findings: Bone mineral density is normal. No fractures or dislocations. No significant joint space narrowing. There is some osseous irregularity at the base of the first metacarpal that could be seen with remote injury.     Impression: No acute findings of the wrist. Electronically Signed: Ariana Bradley MD  4/11/2025 11:26 AM EDT  Workstation ID: [id number]         I ordered the above noted radiological studies. Independently reviewed by me and discussed with radiologist.  See dictation above for official radiology interpretation.      Orders placed during this visit:  Orders Placed This Encounter   Procedures    [facility] 04. Wrist Brace (); Right    XR Wrist 3+ View Right           PROCEDURES    Procedures        MEDICATIONS GIVEN IN ER    Medications   predniSONE (DELTASONE) tablet 60 mg (60 mg Oral Given 4/11/25 1122)   ketorolac (TORADOL) injection 30 mg (30 mg Intramuscular Given 4/11/25 1123)         PROGRESS, DATA ANALYSIS, CONSULTS, AND MEDICAL DECISION MAKING    All labs and radiology studies have been independently reviewed by me.          AS OF 12:03 EDT VITALS:    BP - 121/76  HR - 57  TEMP - 98.4 °F (36.9 °C)  02 SATS - 97%    Medical Decision Making  Patient is a 29-year-old male who presents today with bilateral wrist pain.  He reports the pain is worse on the right side.  Patient had an x-ray obtained to evaluate for acute osseous abnormality.  There is no acute findings on the wrist x-ray.  Patient was placed in a wrist brace for comfort.  Differential diagnosis includes but is not limited to fracture, dislocation,  significant ligamentous injury, septic arthritis, gout flare, new autoimmune arthropathy, or gonococcal arthropathy.  Recommend follow-up with hand specialty.  He will be sent home with a prescription for prednisone.  He was placed in a wrist brace for comfort.  We discussed discharge instructions.  He was given return precautions with understanding.    Problems Addressed:  Bilateral wrist pain: complicated acute illness or injury    Amount and/or Complexity of Data Reviewed  Radiology: ordered.    Risk  Prescription drug management.          DIAGNOSIS  Final diagnoses:   Bilateral wrist pain       New Medications Ordered This Visit   Medications    predniSONE (DELTASONE) tablet 60 mg    ketorolac (TORADOL) injection 30 mg    predniSONE (DELTASONE) 20 MG tablet     Sig: Take 1 tablet by mouth Daily for 4 days.     Dispense:  4 tablet     Refill:  0           I performed hand hygiene on entry into the pt room and upon exit.      Part of this note may be an electronic transcription/translation of spoken language to printed text using the Dragon Dictation System.     Appropriate PPE worn during exam.    Dictated utilizing Dragon dictation     Note Disclaimer: At [facility], we believe that sharing information builds trust and better relationships. You are receiving this note because you recently visited [facility]. It is possible you will see health information before a provider has talked with you about it. This kind of information can be easy to misunderstand. To help you fully understand what it means for your health, we urge you to discuss this note with your provider.

## 2025-04-15 ENCOUNTER — TELEPHONE (OUTPATIENT)
Dept: FAMILY MEDICINE CLINIC | Facility: CLINIC | Age: 30
End: 2025-04-15
Payer: MEDICAID

## 2025-04-15 NOTE — TELEPHONE ENCOUNTER
Pt's significant other calling and stated overall he is doing well, but she is concerned because she feels like his depression/mental health may be taking a turn. She stated that he just got a new job and happens to have today and tomorrow off and wondered about getting him seen for this. I did tell her right now we did not have any openings, but he is too new at the job to miss work. She said in the past he was on prozac in addition to the lamictal and that worked well for his moods. He was on this through [facility]. She was not sure if you can add the prozac back in if we cannot get him in tomorrow or what you could recommend. She is worried because he is starting with risky behavior again and does not want him to lose the opportunity to keep this good job. Please advise.

## 2025-04-16 ENCOUNTER — OFFICE VISIT (OUTPATIENT)
Dept: FAMILY MEDICINE CLINIC | Facility: CLINIC | Age: 30
End: 2025-04-16
Payer: MEDICAID

## 2025-04-16 VITALS
DIASTOLIC BLOOD PRESSURE: 82 MMHG | SYSTOLIC BLOOD PRESSURE: 140 MMHG | WEIGHT: 186 LBS | HEIGHT: 72 IN | HEART RATE: 69 BPM | OXYGEN SATURATION: 98 % | BODY MASS INDEX: 25.19 KG/M2

## 2025-04-16 DIAGNOSIS — F41.9 ANXIETY: ICD-10-CM

## 2025-04-16 DIAGNOSIS — F31.62 BIPOLAR DISORDER, CURRENT EPISODE MIXED, MODERATE: Primary | ICD-10-CM

## 2025-04-16 DIAGNOSIS — M79.642 BILATERAL HAND PAIN: ICD-10-CM

## 2025-04-16 DIAGNOSIS — M79.641 BILATERAL HAND PAIN: ICD-10-CM

## 2025-04-16 PROCEDURE — 1159F MED LIST DOCD IN RCRD: CPT | Performed by: NURSE PRACTITIONER

## 2025-04-16 PROCEDURE — 1160F RVW MEDS BY RX/DR IN RCRD: CPT | Performed by: NURSE PRACTITIONER

## 2025-04-16 PROCEDURE — 99214 OFFICE O/P EST MOD 30 MIN: CPT | Performed by: NURSE PRACTITIONER

## 2025-04-16 PROCEDURE — 1125F AMNT PAIN NOTED PAIN PRSNT: CPT | Performed by: NURSE PRACTITIONER

## 2025-04-16 NOTE — PROGRESS NOTES
"Chief Complaint  Irritable (Having a lot of anger issues and mood swings with anxiety. Has a lot of fear and turns into anger. Feels like he needs something more to help him)    Subjective        Ethan Nicole presents to [facility]  History of Present Illness    Patient presents concerns of irritability, describes mood swings and feeling angry.  He has a history of bipolar disorder and anxiety.  Patient is currently prescribed Lamictal 50 mg twice daily.  He was previously on Abilify. Patient reports he tried a friend's Prozac and that helped significantly. He is wanting to try medication.     Patient also describing hand pain, bilaterally with numbness. He reports went to ER, steroids and Toradol seemed to help.     Objective   Vital Signs:  /82 (BP Location: Left arm, Patient Position: Sitting, Cuff Size: Adult)   Pulse 69   Ht 182.9 cm (72\")   Wt 84.4 kg (186 lb)   SpO2 98%   BMI 25.23 kg/m²   Estimated body mass index is 25.23 kg/m² as calculated from the following:    Height as of this encounter: 182.9 cm (72\").    Weight as of this encounter: 84.4 kg (186 lb).    BMI is >= 25 and <30. (Overweight) The following options were offered after discussion;: exercise counseling/recommendations and nutrition counseling/recommendations      Physical Exam  Constitutional:       Appearance: Normal appearance.   HENT:      Head: Normocephalic.   Cardiovascular:      Rate and Rhythm: Normal rate and regular rhythm.   Pulmonary:      Effort: Pulmonary effort is normal.      Breath sounds: Normal breath sounds.   Abdominal:      General: Abdomen is flat. Bowel sounds are normal.      Palpations: Abdomen is soft.   Musculoskeletal:         General: Normal range of motion.      Cervical back: Neck supple.      Right lower leg: No edema.      Left lower leg: No edema.   Skin:     General: Skin is warm and dry.   Neurological:      Mental Status: He is alert and oriented to person, " place, and time.      Gait: Gait is intact.   Psychiatric:         Attention and Perception: Attention normal.         Mood and Affect: Mood normal.         Speech: Speech normal.        Result Review :                Assessment and Plan   Diagnoses and all orders for this visit:    1. Bipolar disorder, current episode mixed, moderate (Primary)  Assessment & Plan:  Psychological condition is worsening.  Medication changes per orders.  Psychological condition  will be reassessed in 4 weeks.    Orders:  -     FLUoxetine (PROzac) 20 MG capsule; Take 1 capsule by mouth Daily.  Dispense: 30 capsule; Refill: 2    2. Anxiety  Assessment & Plan:  Continue Lamictal, add Prozac 20 mg daily    Orders:  -     FLUoxetine (PROzac) 20 MG capsule; Take 1 capsule by mouth Daily.  Dispense: 30 capsule; Refill: 2    3. Bilateral hand pain  -     Ambulatory Referral to Hand Surgery           I spent 30 minutes caring for Ethan on this date of service. This time includes time spent by me in the following activities:preparing for the visit, reviewing tests, obtaining and/or reviewing a separately obtained history, performing a medically appropriate examination and/or evaluation , counseling and educating the patient/family/caregiver, ordering medications, tests, or procedures, referring and communicating with other health care professionals , documenting information in the medical record, and care coordination  Follow Up   Return in about 6 weeks (around 5/28/2025) for Depression/BPD.  Patient was given instructions and counseling regarding his condition or for health maintenance advice. Please see specific information pulled into the AVS if appropriate.

## 2025-05-01 ENCOUNTER — TELEPHONE (OUTPATIENT)
Dept: FAMILY MEDICINE CLINIC | Facility: CLINIC | Age: 30
End: 2025-05-01
Payer: MEDICAID

## 2025-05-01 NOTE — TELEPHONE ENCOUNTER
Sexual dysfunction can be a side effect of SSRI medications. We can change medications to see if another is better tolerated. Sometimes, this will improve after a few weeks.

## 2025-05-01 NOTE — TELEPHONE ENCOUNTER
Caller: BRODY DE SOUZA    Relationship: Emergency Contact    Best call back number: [phone]    What is the best time to reach you: ANYTIME    Who are you requesting to speak with (clinical staff, provider,  specific staff member): CLINICAL     What was the call regarding: PATIENTS FIANCE IS CALLING BECAUSE PATIENT IS CURRENTLY TAKING     FLUoxetine (PROzac) 20 MG capsule   AND IS GETTING FRUSTRATED AS HE IS REALIZING HE IS UNABLE TO GET ERECTION WHILE ON MEDICATION. PLEASE CALL TO DISCUSS     Is it okay if the provider responds through MyChart: NO

## 2025-05-02 NOTE — TELEPHONE ENCOUNTER
Pt notified and expressed understanding. He would like to start a new medication to see if it makes a difference. Please advise.

## 2025-05-02 NOTE — TELEPHONE ENCOUNTER
Pt notified and expressed understanding. He will pick samples up and get started on this medication tomorrow.

## 2025-05-02 NOTE — TELEPHONE ENCOUNTER
I would recommend Vraylar.  I am going to send the prescription over to the pharmacy, he will take once daily.  I would recommend that he come to the office to get samples while insurance processes the prescription

## 2025-05-07 DIAGNOSIS — F31.62 BIPOLAR DISORDER, CURRENT EPISODE MIXED, MODERATE: ICD-10-CM

## 2025-05-07 RX ORDER — LAMOTRIGINE 25 MG/1
50 TABLET ORAL 2 TIMES DAILY
Qty: 120 TABLET | Refills: 0 | Status: SHIPPED | OUTPATIENT
Start: 2025-05-07 | End: 2025-06-06

## 2025-05-09 ENCOUNTER — TELEPHONE (OUTPATIENT)
Dept: FAMILY MEDICINE CLINIC | Facility: CLINIC | Age: 30
End: 2025-05-09

## 2025-05-09 NOTE — TELEPHONE ENCOUNTER
Spoke with pt r/t no show appt 5/9/25, pt states he was called into work and forgot about appt. Went over no show policy with pt and rescheduled appt

## 2025-05-27 ENCOUNTER — TELEPHONE (OUTPATIENT)
Dept: FAMILY MEDICINE CLINIC | Facility: CLINIC | Age: 30
End: 2025-05-27
Payer: MEDICAID

## 2025-05-27 NOTE — TELEPHONE ENCOUNTER
PATIENT'S SIGNIFICANT OTHER RBODY CALLED AND STATES THE MEDICATION  Cariprazine HCl (Vraylar) 1.5 MG capsule capsule   IS MAKING HIM SUPER HYPER.    CALL BACK NUMBER  [phone]    SHE IS REQUESTING A GENE SITE TEST

## 2025-05-27 NOTE — TELEPHONE ENCOUNTER
Patient missed his last appointment.  I would recommend he reschedule that appointment.  We can complete GeneSight testing.  In the meantime, he needs to take the Vraylar every other day and if the hyperactivity persist, discontinue

## 2025-05-30 ENCOUNTER — OFFICE VISIT (OUTPATIENT)
Dept: FAMILY MEDICINE CLINIC | Facility: CLINIC | Age: 30
End: 2025-05-30
Payer: MEDICAID

## 2025-05-30 VITALS
BODY MASS INDEX: 24.92 KG/M2 | WEIGHT: 184 LBS | HEIGHT: 72 IN | DIASTOLIC BLOOD PRESSURE: 70 MMHG | SYSTOLIC BLOOD PRESSURE: 124 MMHG | OXYGEN SATURATION: 99 % | HEART RATE: 84 BPM

## 2025-05-30 DIAGNOSIS — F41.9 ANXIETY: Primary | ICD-10-CM

## 2025-05-30 DIAGNOSIS — F31.62 BIPOLAR DISORDER, CURRENT EPISODE MIXED, MODERATE: ICD-10-CM

## 2025-05-30 PROCEDURE — 1125F AMNT PAIN NOTED PAIN PRSNT: CPT | Performed by: NURSE PRACTITIONER

## 2025-05-30 PROCEDURE — 1159F MED LIST DOCD IN RCRD: CPT | Performed by: NURSE PRACTITIONER

## 2025-05-30 PROCEDURE — 99213 OFFICE O/P EST LOW 20 MIN: CPT | Performed by: NURSE PRACTITIONER

## 2025-05-30 PROCEDURE — 1160F RVW MEDS BY RX/DR IN RCRD: CPT | Performed by: NURSE PRACTITIONER

## 2025-05-30 NOTE — PROGRESS NOTES
"Chief Complaint  Follow-up (Follow up BPD/anxiety/discuss genesight testing )    Subjective        Ethan Nicole presents to [facility]  History of Present Illness    Patient presents for follow-up visit regarding bipolar disorder and anxiety.  He is prescribed Lamictal 50 mg twice daily but only getting in 50 mg daily.  Prozac was started per request but stopped due to sexual dysfunction.  Vraylar was then started, moved to every other day due to feelings of restlessness, messi, sweating and agitation. He discontinued medication. Patient is wanting to discuss Genesight testing.     Objective   Vital Signs:  /70 (BP Location: Left arm, Patient Position: Sitting, Cuff Size: Adult)   Pulse 84   Ht 182.9 cm (72\")   Wt 83.5 kg (184 lb)   SpO2 99%   BMI 24.95 kg/m²   Estimated body mass index is 24.95 kg/m² as calculated from the following:    Height as of this encounter: 182.9 cm (72\").    Weight as of this encounter: 83.5 kg (184 lb).    BMI is within normal parameters. No other follow-up for BMI required.    Physical Exam  Constitutional:       Appearance: Normal appearance.   HENT:      Head: Normocephalic.   Cardiovascular:      Rate and Rhythm: Normal rate and regular rhythm.   Pulmonary:      Effort: Pulmonary effort is normal.      Breath sounds: Normal breath sounds.   Abdominal:      General: Abdomen is flat. Bowel sounds are normal.      Palpations: Abdomen is soft.   Musculoskeletal:         General: Normal range of motion.      Cervical back: Neck supple.      Right lower leg: No edema.      Left lower leg: No edema.   Skin:     General: Skin is warm and dry.   Neurological:      Mental Status: He is alert and oriented to person, place, and time.      Gait: Gait is intact.   Psychiatric:         Attention and Perception: Attention normal.         Mood and Affect: Mood normal.         Speech: Speech normal.               Result Review :                Assessment and " Plan   Diagnoses and all orders for this visit:    1. Anxiety (Primary)    2. Bipolar disorder, current episode mixed, moderate  Assessment & Plan:  Patient to take Lamictal 50 mg BID as prescribed  D/C Vraylar  [employer] testing today, will make recommendations pending results             I spent 25 minutes caring for Ethan on this date of service. This time includes time spent by me in the following activities:preparing for the visit, obtaining and/or reviewing a separately obtained history, performing a medically appropriate examination and/or evaluation , counseling and educating the patient/family/caregiver, ordering medications, tests, or procedures, documenting information in the medical record, and care coordination  Follow Up   Return in about 3 months (around 8/30/2025) for BPD.  Patient was given instructions and counseling regarding his condition or for health maintenance advice. Please see specific information pulled into the AVS if appropriate.

## 2025-06-02 NOTE — ASSESSMENT & PLAN NOTE
Patient to take Lamictal 50 mg BID as prescribed  D/C Vraylar  [employer] testing today, will make recommendations pending results

## 2025-06-03 ENCOUNTER — RESULTS FOLLOW-UP (OUTPATIENT)
Dept: FAMILY MEDICINE CLINIC | Facility: CLINIC | Age: 30
End: 2025-06-03
Payer: MEDICAID

## 2025-06-03 ENCOUNTER — TELEPHONE (OUTPATIENT)
Dept: FAMILY MEDICINE CLINIC | Facility: CLINIC | Age: 30
End: 2025-06-03
Payer: MEDICAID

## 2025-06-03 NOTE — TELEPHONE ENCOUNTER
Caller: BRODY DE SOUZA    Relationship: Emergency Contact    Best call back number:     [phone]       What is the best time to reach you: ANY    Who are you requesting to speak with (clinical staff, provider,  specific staff member): PCP    Do you know the name of the person who called:     What was the call regarding: PATIENTS' WIFE WAS CALLING TO GET HIS GENE SIGHT TESTING RESULTS. SHE IS LOOKING TO GET HIM SOME MEDICATION FOR HIS BIPOLAR DISORDER IMMEDIATELY.    Is it okay if the provider responds through MyChart:

## 2025-06-03 NOTE — PROGRESS NOTES
Let patient know that his GeneSight testing is in.  Recommendations based on this list include Pristiq, Wellbutrin, Effexor, Viibryd for depression and anxiety.  Mood stabilizers that are appropriate for him do include Lamictal so we can continue that as well and add something for his depression.  Patient is also reduced folic acid converter so I would also recommend that he get L-methylfolate 7.5 mg daily and take that to help absorb his medications better.  He can purchase this OTC in a pharmacy or off [employer]

## 2025-06-04 RX ORDER — DESVENLAFAXINE 50 MG/1
50 TABLET, FILM COATED, EXTENDED RELEASE ORAL DAILY
Qty: 30 TABLET | Refills: 1 | Status: SHIPPED | OUTPATIENT
Start: 2025-06-04

## 2025-06-04 NOTE — PROGRESS NOTES
I would like to try Pristiq to see how he responds.  The Lamictal should be continued as prescribed.

## 2025-06-04 NOTE — TELEPHONE ENCOUNTER
Spoke with patient before leaving yesterday as his results came in. Advised of what Christelle said and sent a message back to her on the result note.

## 2025-06-08 DIAGNOSIS — F31.62 BIPOLAR DISORDER, CURRENT EPISODE MIXED, MODERATE: ICD-10-CM

## 2025-06-09 RX ORDER — LAMOTRIGINE 25 MG/1
TABLET ORAL
Qty: 120 TABLET | Refills: 0 | Status: SHIPPED | OUTPATIENT
Start: 2025-06-09

## 2025-06-13 ENCOUNTER — TELEPHONE (OUTPATIENT)
Dept: FAMILY MEDICINE CLINIC | Facility: CLINIC | Age: 30
End: 2025-06-13
Payer: MEDICAID

## 2025-06-13 NOTE — TELEPHONE ENCOUNTER
Please call Ethan Cueto is manic and needs to get in treatment, is there anyway we can help her get help. Please call her. [phone]

## 2025-06-13 NOTE — TELEPHONE ENCOUNTER
Patient needs a Psychiatrist and with this type of behavior  - I would highly recommend they consider [facility] to get his behavior stabilized

## 2025-06-13 NOTE — TELEPHONE ENCOUNTER
Pt S.O calling to give an update on patient's current condition. She stated 5 days ago he got very severe with a manic episode and he walked down the middle of [address] screaming for the  to come get him. The police were called and they did not take him or do anything. She was very upset and would not allow him to come back home or get into the vehicle. She said the he did relapse and has gone days without sleeping. He never started on Pristiq because he kept saying he was fine and did not need medication anymore. He told her today that he is willing to get treatment and help so she is going to try and have him go to [facility] for his bipolar to see if they will help. She just wanted to let you know what was going on and she is hoping he is now willing to start the Pristiq because he becomes very violent and suicidal when these episodes happen.

## 2025-06-30 RX ORDER — DESVENLAFAXINE 50 MG/1
50 TABLET, FILM COATED, EXTENDED RELEASE ORAL DAILY
Qty: 90 TABLET | Refills: 1 | Status: SHIPPED | OUTPATIENT
Start: 2025-06-30

## 2025-07-23 ENCOUNTER — HOSPITAL ENCOUNTER (EMERGENCY)
Facility: HOSPITAL | Age: 30
Discharge: LEFT BEFORE DISCHARGE | End: 2025-07-23
Attending: EMERGENCY MEDICINE
Payer: MEDICAID

## 2025-07-23 VITALS
OXYGEN SATURATION: 97 % | RESPIRATION RATE: 20 BRPM | DIASTOLIC BLOOD PRESSURE: 86 MMHG | WEIGHT: 169.97 LBS | HEART RATE: 91 BPM | BODY MASS INDEX: 23.02 KG/M2 | TEMPERATURE: 98.8 F | HEIGHT: 72 IN | SYSTOLIC BLOOD PRESSURE: 134 MMHG

## 2025-07-23 PROCEDURE — 99211 OFF/OP EST MAY X REQ PHY/QHP: CPT | Performed by: EMERGENCY MEDICINE

## 2025-07-24 ENCOUNTER — TELEPHONE (OUTPATIENT)
Dept: FAMILY MEDICINE CLINIC | Facility: CLINIC | Age: 30
End: 2025-07-24
Payer: MEDICAID

## 2025-07-24 NOTE — TELEPHONE ENCOUNTER
Spoke with Nory and she stated that they went to [facility] yesterday and they provider there recommended follow up with PCP. They did not make medication changes he is on the Pristiq and Lamictal and they feel these things are helping with his messi episodes and depression, but his anxiety is out of control. She said they did give him a Klonopin last night, but did not prescribe anything because they thought it best for him to follow up with his provider. She is wondering about something to help him sleep because the anxiety is controlling his life so much he cannot sleep, hold a job or do anything.

## 2025-07-24 NOTE — TELEPHONE ENCOUNTER
I want you to keep the follow-up tomorrow and we can discuss symptoms, decide whether or not this is anxiety or messi and start medications that can help manage all symptoms including insomnia

## 2025-07-24 NOTE — TELEPHONE ENCOUNTER
Caller: BRODY DE SOUZA    Relationship to patient: Emergency Contact    Best call back number: [phone]    Chief complaint: EXTREME ANXIETY    Type of visit: OFFICE VISIT    Requested date: 7.25.25       Additional notes:PATIENT WENT TO [facility] ON 7.23.25 PATIENT IS HAVING REALLY BAD ANXIETY. THEY WANTED PATIENT TO BE SEEN BY PCP TO FOLLOW UP ON THIS. PATIENT HAS BEEN TO HOSPITAL SEVERAL TIMES FOR THIS ISSUE. THE POLICE HAVE ALSO BEEN CONTACTED DUE TO ANXIETY. THINKS PATIENT ALSO HAS A LITTLE BIT OF JASON.     PLEASE CALL!

## 2025-07-24 NOTE — TELEPHONE ENCOUNTER
Noted.  Patient has been advised previously to go to [facility] for treatment and follow-up with psychiatrist.  Have either of those things been done?  Has the hospital adjusted any of his medication?

## 2025-07-25 ENCOUNTER — OFFICE VISIT (OUTPATIENT)
Dept: FAMILY MEDICINE CLINIC | Facility: CLINIC | Age: 30
End: 2025-07-25
Payer: MEDICAID

## 2025-07-25 VITALS
DIASTOLIC BLOOD PRESSURE: 90 MMHG | WEIGHT: 175 LBS | SYSTOLIC BLOOD PRESSURE: 138 MMHG | HEIGHT: 72 IN | OXYGEN SATURATION: 95 % | HEART RATE: 123 BPM | BODY MASS INDEX: 23.7 KG/M2

## 2025-07-25 DIAGNOSIS — F31.62 BIPOLAR DISORDER, CURRENT EPISODE MIXED, MODERATE: ICD-10-CM

## 2025-07-25 DIAGNOSIS — F41.9 ANXIETY: ICD-10-CM

## 2025-07-25 DIAGNOSIS — F41.9 ANXIETY: Primary | ICD-10-CM

## 2025-07-25 LAB
AMPHET+METHAMPHET UR QL: NEGATIVE
AMPHETAMINES UR QL: NEGATIVE
BARBITURATES UR QL SCN: NEGATIVE
BENZODIAZ UR QL SCN: POSITIVE
BUPRENORPHINE SERPL-MCNC: NEGATIVE NG/ML
CANNABINOIDS SERPL QL: POSITIVE
COCAINE UR QL: NEGATIVE
METHADONE UR QL SCN: NEGATIVE
OPIATES UR QL: NEGATIVE
OXYCODONE UR QL SCN: NEGATIVE
PCP UR QL SCN: NEGATIVE
TRICYCLICS UR QL SCN: NEGATIVE

## 2025-07-25 PROCEDURE — 1125F AMNT PAIN NOTED PAIN PRSNT: CPT | Performed by: NURSE PRACTITIONER

## 2025-07-25 PROCEDURE — 80306 DRUG TEST PRSMV INSTRMNT: CPT | Performed by: NURSE PRACTITIONER

## 2025-07-25 PROCEDURE — 1160F RVW MEDS BY RX/DR IN RCRD: CPT | Performed by: NURSE PRACTITIONER

## 2025-07-25 PROCEDURE — 99214 OFFICE O/P EST MOD 30 MIN: CPT | Performed by: NURSE PRACTITIONER

## 2025-07-25 PROCEDURE — 1159F MED LIST DOCD IN RCRD: CPT | Performed by: NURSE PRACTITIONER

## 2025-07-25 RX ORDER — LAMOTRIGINE 100 MG/1
100 TABLET ORAL 2 TIMES DAILY
Qty: 60 TABLET | Refills: 1 | Status: SHIPPED | OUTPATIENT
Start: 2025-07-25 | End: 2025-08-24

## 2025-07-25 RX ORDER — CLONAZEPAM 0.5 MG/1
0.5 TABLET ORAL 2 TIMES DAILY PRN
Qty: 28 TABLET | Refills: 0 | Status: SHIPPED | OUTPATIENT
Start: 2025-07-25 | End: 2025-07-25 | Stop reason: SDUPTHER

## 2025-07-25 RX ORDER — CLONAZEPAM 0.5 MG/1
0.5 TABLET ORAL 2 TIMES DAILY PRN
Qty: 28 TABLET | Refills: 0 | Status: SHIPPED | OUTPATIENT
Start: 2025-07-25 | End: 2025-08-08

## 2025-07-25 RX ORDER — LAMOTRIGINE 100 MG/1
100 TABLET ORAL 2 TIMES DAILY
Qty: 60 TABLET | Refills: 1 | Status: SHIPPED | OUTPATIENT
Start: 2025-07-25 | End: 2025-07-25 | Stop reason: SDUPTHER

## 2025-07-25 NOTE — PROGRESS NOTES
"Chief Complaint  Anxiety    Subjective        Ethan Nicole presents to [facility]    Anxiety      Patient presents with concerns of increased anxiety.  He has a known history of bipolar disorder.  Patient is prescribed Lamictal 50 mg twice daily and Pristiq 50 mg daily.  Patient initially came to office with his fiancée, became agitated and left the premises briefly.  He return for visit alone.  Patient's fiancé was tearful, left.  [place] and [place] police are on scene due to his agitation and aggressive behavior.  Patient states that he knows he is aggressive, gets very angry quickly.  Patient describes severe anxiety and panic.  He attributes most of his agitation to his fiancée, concerns of infidelity.  Patient has 2 small children, the family is currently homeless.  He is donating plasma and working in hopes to be able to afford at home.  Patient was at [facility] on July 23 but left without being seen.  According to fiancé, [place] [facility] but I do not see any notes available representing a visit.  He was advised to seek evaluation from [facility] or [facility] on June 13 but patient was not evaluated.  Patient's fiancé states that these facilities just tell him to follow-up with PCP.  He has been referred to psychiatry in the past, has yet to establish care.  Patient has been incarcerated multiple times which causes lapse in medication treatment.  Patient denies any thoughts of self-harm or suicide.  He states he is just trying to \"do better for his kids.\"    Objective   Vital Signs:  /90 (BP Location: Left arm, Patient Position: Sitting, Cuff Size: Adult)   Pulse (!) 123   Ht 182.9 cm (72\")   Wt 79.4 kg (175 lb)   SpO2 95%   BMI 23.73 kg/m²   Estimated body mass index is 23.73 kg/m² as calculated from the following:    Height as of this encounter: 182.9 cm (72\").    Weight as of this encounter: 79.4 kg (175 lb).    BMI is " within normal parameters. No other follow-up for BMI required.      Physical Exam  Constitutional:       Appearance: Normal appearance.   HENT:      Head: Normocephalic.   Cardiovascular:      Rate and Rhythm: Normal rate and regular rhythm.   Pulmonary:      Effort: Pulmonary effort is normal.      Breath sounds: Normal breath sounds.   Abdominal:      General: Abdomen is flat. Bowel sounds are normal.      Palpations: Abdomen is soft.   Musculoskeletal:         General: Normal range of motion.      Cervical back: Neck supple.      Right lower leg: No edema.      Left lower leg: No edema.   Skin:     General: Skin is warm and dry.   Neurological:      Mental Status: He is alert and oriented to person, place, and time.      Gait: Gait is intact.   Psychiatric:         Attention and Perception: Attention normal.         Mood and Affect: Mood is anxious. Affect is angry.         Speech: Speech is rapid and pressured.         Behavior: Behavior is agitated and aggressive.        Result Review :                Assessment and Plan   Diagnoses and all orders for this visit:    1. Anxiety (Primary)  -     Ambulatory Referral to Psychiatry  -     clonazePAM (KlonoPIN) 0.5 MG tablet; Take 1 tablet by mouth 2 (Two) Times a Day As Needed for Anxiety for up to 14 days.  Dispense: 28 tablet; Refill: 0    2. Bipolar disorder, current episode mixed, moderate  -     Ambulatory Referral to Psychiatry  -     Urine Drug Screen - Urine, Clean Catch; Future  -     lamoTRIgine (LaMICtal) 100 MG tablet; Take 1 tablet by mouth 2 (Two) Times a Day for 30 days.  Dispense: 60 tablet; Refill: 1  -     Urine Drug Screen - Urine, Clean Catch    Patient and I had a long discussion about his symptoms.  While I understand that he is anxious and having panic attacks, his mood is also unstable.  We discussed the ramifications of his aggressive behavior.  Patient is aware but having difficulty controlling his temper.  He is calm with me during the  visit and is able to discuss the treatment plan.  We will do a urine screen today.  He advises that 1 week ago, stopped smoking weed and does no other illicit substances.  Patient is agreeable to medication adjustments.  I will increase the Lamictal to 100 mg twice daily.  I will prescribe a short-term prescription for Klonopin to be used 1 tablet twice daily as needed for severe anxiety or panic.  We discussed how the medications need to be taken, should not increase dose without first discussing with me.  Patient will be referred again to psychiatry, urgently.  Will return in 4 weeks, sooner if needed.  I did encourage that if his fiancée is a significant trigger to take some space for a few days.       I spent 30 minutes caring for Ethan on this date of service. This time includes time spent by me in the following activities:preparing for the visit, reviewing tests, obtaining and/or reviewing a separately obtained history, performing a medically appropriate examination and/or evaluation , counseling and educating the patient/family/caregiver, ordering medications, tests, or procedures, referring and communicating with other health care professionals , documenting information in the medical record, and care coordination  Follow Up   Return in about 4 weeks (around 8/22/2025) for Anxiety.  Patient was given instructions and counseling regarding his condition or for health maintenance advice. Please see specific information pulled into the AVS if appropriate.

## 2025-07-30 ENCOUNTER — RESULTS FOLLOW-UP (OUTPATIENT)
Dept: FAMILY MEDICINE CLINIC | Facility: CLINIC | Age: 30
End: 2025-07-30
Payer: MEDICAID

## 2025-07-30 NOTE — PROGRESS NOTES
Positive for benzodiazepines and THC.  Patient advised at the visit that he had stopped smoking for marijuana chronically but just 1 week prior to visit.  Let him know that if we are going to prescribe any type of benzodiazepine, he cannot take any other medication that is not prescribed to him.  Please see how patient is doing since his visit on Friday

## 2025-07-31 RX ORDER — LAMOTRIGINE 25 MG/1
TABLET ORAL
Qty: 120 TABLET | OUTPATIENT
Start: 2025-07-31

## 2025-08-04 ENCOUNTER — TELEPHONE (OUTPATIENT)
Dept: FAMILY MEDICINE CLINIC | Facility: CLINIC | Age: 30
End: 2025-08-04
Payer: MEDICAID

## 2025-08-05 DIAGNOSIS — F41.9 ANXIETY: ICD-10-CM

## 2025-08-05 RX ORDER — CLONAZEPAM 0.5 MG/1
0.5 TABLET ORAL 2 TIMES DAILY PRN
Qty: 28 TABLET | Refills: 0 | Status: SHIPPED | OUTPATIENT
Start: 2025-08-05 | End: 2025-08-19

## 2025-08-20 RX ORDER — DESVENLAFAXINE 50 MG/1
50 TABLET, FILM COATED, EXTENDED RELEASE ORAL DAILY
Qty: 90 TABLET | Refills: 1 | OUTPATIENT
Start: 2025-08-20

## 2025-08-21 ENCOUNTER — TELEPHONE (OUTPATIENT)
Dept: FAMILY MEDICINE CLINIC | Facility: CLINIC | Age: 30
End: 2025-08-21
Payer: MEDICAID

## 2025-08-27 ENCOUNTER — TELEPHONE (OUTPATIENT)
Dept: FAMILY MEDICINE CLINIC | Facility: CLINIC | Age: 30
End: 2025-08-27
Payer: MEDICAID

## 2025-08-29 ENCOUNTER — TELEPHONE (OUTPATIENT)
Dept: FAMILY MEDICINE CLINIC | Facility: CLINIC | Age: 30
End: 2025-08-29
Payer: MEDICAID